# Patient Record
Sex: FEMALE | Race: WHITE | Employment: FULL TIME | URBAN - METROPOLITAN AREA
[De-identification: names, ages, dates, MRNs, and addresses within clinical notes are randomized per-mention and may not be internally consistent; named-entity substitution may affect disease eponyms.]

---

## 2017-06-08 ENCOUNTER — PATIENT OUTREACH (OUTPATIENT)
Dept: OTHER | Age: 62
End: 2017-06-08

## 2017-06-08 NOTE — PROGRESS NOTES
Patient on report as eligible for Case Management. Left discreet message on voicemail with this CM contact information. Will attempt to contact again to offer [address] Management services.

## 2017-06-09 ENCOUNTER — PATIENT OUTREACH (OUTPATIENT)
Dept: OTHER | Age: 62
End: 2017-06-09

## 2017-06-09 NOTE — LETTER
6/9/2017 9:00 AM 
 
Ms. Nisha Phipps [address] [place] [address] Dear Ms. Carolina Adams, My name is Odette Joiner, Employee Care Manager for [employer] and I have been trying to reach you. The Employee Care  is a free-of-charge confidential service provided to our employees and their family members covered by the [employer]. The program will provide an employee and his/her family with the [employer] [facility] expertise to assist in navigating the health care delivery system, provider services, and their overall care needsso as to assure and improve health care interactions and enhance the quality of life. This program is designed to provide you with the opportunity to have a [facility] care manager partner with you for the following services: 
 
1.)  Care transitions-such as when you come home from the hospital 
2.) When help is needed to manage your disease process 3.) When you are faced with managing a chronic or complex medical condition [employer] is dedicated to empowering the good health of its community and improving the quality of care and care experiences for employees and their families. We are commited to safeguarding patient confidentiality and privacy,  assuring that every employee has the respect he or she deserves in managing their health. The information shared with your care manager will not be shared with anyone else aside from those you identify as part of your care team, and will only be used to assist you with any identified care needs. Please contact me if you would like this service provided to you. Sincerely, Odette Joiner, RN 
[phone]

## 2017-06-09 NOTE — PROGRESS NOTES
Patient identified as eligible for [address] [place] [address] services. Second telephone outreach attempted. Left discreet voicemail with this CM confidential contact information. Will send UTR letter.

## 2017-06-23 ENCOUNTER — PATIENT OUTREACH (OUTPATIENT)
Dept: OTHER | Age: 62
End: 2017-06-23

## 2017-07-27 ENCOUNTER — PATIENT OUTREACH (OUTPATIENT)
Dept: OTHER | Age: 62
End: 2017-07-27

## 2017-07-27 NOTE — PROGRESS NOTES
Resolving current episode for case management due to patient unable to reach. Patient has not been reached after repeated calls and letters. Letter sent to patient notifying completion of services due to unable to reach. This writer's contact information and information regarding program services included in materials sent.

## 2017-07-27 NOTE — LETTER
7/27/2017 10:45 AM 
 
Ms. Anupam Salcido [address] [place] [address] Dear Ms. Claudia Sewell, My name is Zuleyma Mehta , Employee Care Manager for [employer], and I have been trying to reach you. The Employee Care  is a free-of-charge, confidential service provided to our employees and their family members covered by the [employer]. Part of my job is to follow up with members who have recently been in the hospital or emergency room, to help them coordinate their care and answer questions they may have about their visit. Due to not being able to reach you within 30 days, I am closing out the current program, but will remain available to you should you have any questions. As healthcare providers, we know that patients do better when they have close follow up with a primary care provider (PCP), especially after a hospital or emergency department visit. If you do not have a PCP, I can help you find one that is convenient to you and covered by your insurance. I can also help you understand any after visit instructions, such as what symptoms to watch out for, or any new or changed medications. Remember that you can access your After Visit Summary by logging into your [employer] account. If you do not have a [employer] account, I can help you request access. Our program is designed to provide you with the opportunity to have a [facility] care manager partner with you for your healthcare needs. Please contact me at the below number if I can provide you with assistance for any of the above services. Sincerely, Zuleyma Mehta, RN 
[phone]

## 2018-06-27 PROBLEM — I48.91 ATRIAL FIBRILLATION WITH RVR (HCC): Status: ACTIVE | Noted: 2018-06-27

## 2018-06-29 ENCOUNTER — PATIENT OUTREACH (OUTPATIENT)
Dept: OTHER | Age: 63
End: 2018-06-29

## 2018-06-29 NOTE — PROGRESS NOTES
Patient on [facility] discharge report dated 6/29. Left message on voicemail. Will attempt to contact again. Need to complete post-discharge assessment.

## 2018-07-02 ENCOUNTER — PATIENT OUTREACH (OUTPATIENT)
Dept: OTHER | Age: 63
End: 2018-07-02

## 2018-07-02 NOTE — PROGRESS NOTES
Second attempt to reach patient for [facility], and discharge assessment. Discreet VM left. Will send UTR letter.

## 2018-07-02 NOTE — LETTER
7/2/2018 10:43 AM 
 
Ms. Thelma Ennis [address] [address] Dear Ms. Gloria Webb, My name is Mayank Valero, Employee Care Manager for [facility], and I have been trying to reach you. The Employee Care  is a free-of-charge, confidential service provided to our employees and their family members covered by the [employer]. Part of my job is to follow up with members who have recently been in the hospital or emergency room, to help them coordinate their care and answer questions they may have about their visit. I am able to provide assistance with medication questions, scheduling needed follow-up appointments, and arranging services like home health or home medical equipment. I can also provide education regarding your hospital or ER visit as well as your medical conditions. As healthcare providers, we know that patients do better when they have close follow up with a primary care provider (PCP), especially after a hospital or emergency department visit. If you do not have a PCP, I can help you find one that is convenient to you and covered by your insurance. I can also help you understand any after visit instructions, such as what symptoms to watch out for, or any new or changed medications. Remember that you can access your After Visit Summary by logging into your [employer] account. If you do not have a [employer] account, I can help you request access. Our program is designed to provide you with the opportunity to have a [facility] care manager partner with you for your healthcare needs. Please contact me at the below number if I can provide you with assistance for any of the above services. Sincerely, Mayank Valero RN, BSN  Employee Care Manager [address] [place] [address], [address] [place], [place] [address] [facility] Cell [phone] Fax [email or web address] 
 [place] [email or web address]. aspx

## 2018-07-26 ENCOUNTER — PATIENT OUTREACH (OUTPATIENT)
Dept: OTHER | Age: 63
End: 2018-07-26

## 2018-07-26 NOTE — PROGRESS NOTES
ZA follow up. * Covering for CM Ivin Leyden, RN    Telephone attempt to contact patient for transitions of care. Left discreet message on voicemail with this CC contact information. Will inform CM was unable to reach.

## 2018-08-23 ENCOUNTER — PATIENT OUTREACH (OUTPATIENT)
Dept: OTHER | Age: 63
End: 2018-08-23

## 2018-08-23 NOTE — LETTER
8/23/2018 12:53 PM 
 
Ms. Candi Butcher [address] [address] Dear Ms. Isidoro Newell, My name is Luba Morillo , Employee Care Manager for [employer], and I have been trying to reach you. The Employee Care Management program is a free-of-charge, confidential service provided to our employees and their family members covered by the [employer]. Part of my job is to follow up with members who have recently been in the hospital or emergency room, to help them coordinate their care and answer questions they may have about their visit. As healthcare providers, we know that patients do better when they have close follow up with a primary care provider (PCP), especially after a hospital or emergency department visit. If you do not have a PCP, I can help you find one that is convenient to you and covered by your insurance. I can also help you understand any after visit instructions, such as what symptoms to watch out for, or any new or changed medications. Remember that you can access your After Visit Summary by logging into your LEHR account. If you do not have a [employer] account, I can help you request access. Our program is designed to provide you with the opportunity to have a [facility] care manager partner with you for your healthcare needs. Please contact me at the below number if I can provide you with assistance for any of the above services. Sincerely, Luba Morillo RN, BSN  Employee Care Manager [address] [place] [address], [address] [place] [address] [address] [place] [address] Cell [phone] Fax [email or web address] ECM [email or web address]. aspx

## 2019-01-09 PROBLEM — F17.200 NICOTINE DEPENDENCE: Status: ACTIVE | Noted: 2019-01-09

## 2019-01-09 PROBLEM — E78.5 HYPERLIPIDEMIA: Chronic | Status: ACTIVE | Noted: 2019-01-09

## 2019-01-14 ENCOUNTER — PATIENT OUTREACH (OUTPATIENT)
Dept: OTHER | Age: 64
End: 2019-01-14

## 2019-01-14 RX ORDER — IBUPROFEN 200 MG
1 TABLET ORAL EVERY 24 HOURS
COMMUNITY
End: 2019-03-12

## 2019-01-14 NOTE — PROGRESS NOTES
Transition Of Care Note Patient discharged from [facility] admitted on  and discharged on  for COPD exacerbation. Medical History:    
Past Medical History:  
Diagnosis Date  Acquired hypothyroidism 2016  Acute respiratory failure ([address] 2016  Arrhythmia   
 afib  Autoimmune disease ([facility] [address] [facility] 2009  
 lupus  Cataract, left  COPD  Delivery normal   
 Diverticulosis  Facial contusion 2011  Gastrointestinal disorder  GERD (gastroesophageal reflux disease)   
 hx of  
 Hypertension  Other ill-defined conditions(799.89)  Seizures ([facility] [address] Hx of, none since childhood  Thyroid disease   
 hypothyroid Care Manager contacted the patient by telephone to perform post hospital discharge assessment. Verified  and zip code with patient as identifiers. Provided introduction to self, and explanation of the Nurse Care Manager role. Red Flags:  You have new or worse trouble breathing. You cough up blood. You have a fever. You cough more deeply or more often, especially if you notice more mucus or a change in the color of your mucus.  You have new or worse swelling in your legs or belly.  You are not getting better as expected. Condition Focused Assessment:  
Patient reports the following:  
Respiratory Condition Focused Assessment Supplemental oxygen use? no  
Clean and working equipment? {n/a Oxygen settings- n/a Cough yes Patient reported important numbers to know: 
Oxygen level 92% at hospital- awaiting pulse ox purchase Temperature afebrile Description of any sputum Opaque-minimal in amount Pain 0/10 Weight Did not weigh today- last weight of 170 Any change in activity level? Yes, but patient reports rapid improvement since hospitalization Any of the following? Shortness of breath or difficulty breathing yes Dizziness/lightheadedness no Fever no Low body temperature no Chills or shaking no Sweating no Dyspnea on exertion yes Fatigue yes Anxiety no Confusionno Unexplained change in weight loss no Sleep disturbance no Incentive Spirometer? yes Does patient have action plan? yes Medication:  
New Medications at Discharge:  
tiotropium (SPIRIVA) 18 mcg inhalation capsule Take 1 Cap by inhalation daily. Qty: 30 Cap, Refills: 0  
   
budesonide-formoterol (SYMBICORT) 160-4.5 mcg/actuation HFAA Take 2 Puffs by inhalation two (2) times a day. Qty: 1 Inhaler, Refills: 0  
   
doxycycline (VIBRAMYCIN) 100 mg capsule Take 1 Cap by mouth every twelve (12) hours for 2 days. Qty: 4 Cap, Refills: 0  
   
predniSONE (DELTASONE) 10 mg tablet 5 tablets daily x 3 days, 4 tablets daily x 3 days, 3 tablets daily x 3 days, 2 tablets daily x 3 days, 1 tablet daily x 3 days Qty: 45 Tab, Refills: 0 Changed Medications at Discharge: none noted Discontinued Medications at Discharge: patient completed vibramycin tabs Current Outpatient Medications Medication Sig  
 tiotropium (SPIRIVA) 18 mcg inhalation capsule Take 1 Cap by inhalation daily.  budesonide-formoterol (SYMBICORT) 160-4.5 mcg/actuation HFAA Take 2 Puffs by inhalation two (2) times a day.  predniSONE (DELTASONE) 10 mg tablet 5 tablets daily x 3 days, 4 tablets daily x 3 days, 3 tablets daily x 3 days, 2 tablets daily x 3 days, 1 tablet daily x 3 days  dilTIAZem CD (CARDIZEM CD) 120 mg ER capsule Take 120 mg by mouth daily.  rivaroxaban (XARELTO) 20 mg tab tablet Take 1 Tab by mouth daily (with dinner).  nebivolol (BYSTOLIC) 10 mg tablet Take 10 mg by mouth nightly.  levothyroxine (SYNTHROID) 75 mcg tablet Take 75 mcg by mouth Daily (before breakfast).  rosuvastatin (CRESTOR) 10 mg tablet Take 10 mg by mouth nightly.  albuterol-ipratropium (DUONEB) 2.5 mg-0.5 mg/3 ml nebu 3 mL by Nebulization route every six (6) hours as needed. Indications: CHRONIC OBSTRUCTIVE PULMONARY DISEASE WITH BRONCHOSPASMS  cholecalciferol (VITAMIN D3) 1,000 unit cap Take 5,000 Units by mouth daily.  albuterol (PROVENTIL HFA, VENTOLIN HFA, PROAIR HFA) 90 mcg/actuation inhaler Take 2 puffs by inhalation every four (4) hours as needed for Wheezing or Shortness of Breath.  KRILL OIL PO Take  by mouth daily. No current facility-administered medications for this visit. There are no discontinued medications. Performed medication reconciliation with patient, and patient verbalizes understanding of administration of home medications. There were no barriers to obtaining medications identified at this time. Inpatient RRAT score: 12 Was this a readmission? no Patient stated reason for the readmission: n/a Barriers/Support system: 
patient and spouse Barriers/Challenges to Care: []  Decline in memory    []  Language barrier    
[]  Emotional                  [x]  Limited mobility 
[]  Lack of motivation     [] Vision, hearing or cognitive impairment []  Knowledge [] Financial Barriers []  Lack of support  []  Pain []  Other []  None CM Identified  Problems 
 (contributing problems for risk for readmission) 1. Impaired gas exchange due to COPD exacerbation- educated on red flag symptoms, and patient has action plan 2. Self-care deficit related to fatigue-pt has supportive family including spouse, sister and child within local area to assist 
3. Tobacco dependence increasing risk for COPD exacerbation- patient states she has had 3 cigarettes in one week, using nicotine patch. Highly motivated to quit Discharge Instructions : 
Reviewed discharge instructions with patient. Patient verbalizes understanding of discharge instructions and follow-up care. Advance Care Planning:  
Patient was offered the opportunity to discuss advance care planning:  no    
Does patient have an Advance Directive:  no If no, did you provide information on Caring Connections?   no  
 
 PCP/Specialist follow up: Patient scheduled to follow up with Nava Mcelroy MD on 1/16. Future Appointments Date Time Provider Joaquina Pemberton 1/21/2019  3:15 PM Neill Osgood,  [facility] Reviewed red flags with patient, and patient verbalizes understanding. Patient given an opportunity to ask questions. No other clinical/social/functional needs noted. The patient agrees to contact the PCP office for questions related to their healthcare. The patient expressed thanks, offered no additional questions and ended the call.

## 2019-01-14 NOTE — PROGRESS NOTES
Patient on [facility] discharge report dated 1/14 for COPD exacerbation at [facility] discharged on 1/12. Patient has previously been unable to reach by this CM. Left message on voicemail. Will attempt to contact again. Need to complete post-discharge assessment.

## 2019-01-22 ENCOUNTER — PATIENT OUTREACH (OUTPATIENT)
Dept: OTHER | Age: 64
End: 2019-01-22

## 2019-01-28 ENCOUNTER — PATIENT OUTREACH (OUTPATIENT)
Dept: OTHER | Age: 64
End: 2019-01-28

## 2019-01-28 NOTE — PROGRESS NOTES
Attempt to reach patient for follow up. Discreet VM left with contact information. To send lost to follow up letter.

## 2019-02-11 ENCOUNTER — PATIENT OUTREACH (OUTPATIENT)
Dept: OTHER | Age: 64
End: 2019-02-11

## 2019-02-11 NOTE — LETTER
2/11/2019 3:45 PM 
 
Ms. Haley Butcher [address] [address] Dear Ms. Giovanni Goltz, My name is Alondra Moreira , Employee Care Manager for [facility], and I have been trying to reach you. The Employee Care Management program is a free-of-charge, confidential service provided to our employees and their family members covered by the [employer]. Part of my job is to follow up with members who have recently been in the hospital or emergency room, to help them coordinate their care and answer questions they may have about their visit. As healthcare providers, we know that patients do better when they have close follow up with a primary care provider (PCP), especially after a hospital or emergency department visit. If you do not have a PCP, I can help you find one that is convenient to you and covered by your insurance. I can also help you understand any after visit instructions, such as what symptoms to watch out for, or any new or changed medications. Remember that you can access your After Visit Summary by logging into your PLTech account. If you do not have a [employer] account, I can help you request access. Our program is designed to provide you with the opportunity to have a [facility] care manager partner with you for your healthcare needs. Please contact me at the below number if I can provide you with assistance for any of the above services. Sincerely, Alondra Moreira RN, BSN  Employee Care Manager [address] [place] [address], [address] [place] [address] Cell [phone] Fax [email or web address] [email or web address]. aspx

## 2019-03-13 ENCOUNTER — PATIENT OUTREACH (OUTPATIENT)
Dept: OTHER | Age: 64
End: 2019-03-13

## 2019-03-13 NOTE — PROGRESS NOTES
Patient on report as eligible for Case Management. Left discreet message on voicemail with this CM contact information. Patient is formerly lost to follow-up, noted CM Mauricio Dowling assigned to patient, sent message regarding unable to reach status. Will also send UTR letter.

## 2019-03-13 NOTE — LETTER
3/13/2019 4:09 PM 
 
Ms. Dwayne Butcher [address] [place] [phone] Dear Ms. Patel Goes, My name is Jovanni Gifford, Employee Care Manager for [employer], and I have been trying to reach you. The Employee Care  is a free-of-charge, confidential service provided to our employees and their family members covered by the [employer]. Part of my job is to follow up with members who have recently been in the hospital or emergency room, to help them coordinate their care and answer questions they may have about their visit. I am able to provide assistance with medication questions, scheduling needed follow-up appointments, and arranging services like home health or home medical equipment. I can also provide education regarding your hospital or ER visit as well as your medical conditions. As healthcare providers, we know that patients do better when they have close follow up with a primary care provider (PCP), especially after a hospital or emergency department visit. If you do not have a PCP, I can help you find one that is convenient to you and covered by your insurance. I can also help you understand any after visit instructions, such as what symptoms to watch out for, or any new or changed medications. Remember that you can access your After Visit Summary by logging into your [employer] account. If you do not have a [employer] account, I can help you request access. Our program is designed to provide you with the opportunity to have a [employer] care manager partner with you for your healthcare needs. Please contact me at the below number if I can provide you with assistance for any of the above services. Sincerely, Jovanni Gifford RN, BSN  Employee Care Manager [address] [place] [address], [address] [place] [address] [facility] Cell [phone] Fax [email or web address] 
 [place] [email or web address]. [email or web address]

## 2019-03-28 ENCOUNTER — PATIENT OUTREACH (OUTPATIENT)
Dept: OTHER | Age: 64
End: 2019-03-28

## 2019-03-28 NOTE — LETTER
3/28/2019 3:48 PM 
 
Ms. Valentina Butcher [address] [address] Dear Ms. Julius Yun, My name is Jose Guadalupe Parisi, Employee Care Manager for [employer], and I have been trying to reach you. The Employee Care Management [facility]) program is a free-of-charge, confidential service provided to our employees and their family members covered by the [facility]. I can help you with care transitions such as when you come home from the hospital, when help is needed to manage your disease, or when you need assistance coordinating services or appointments. [facility] now partners with [facility]. If you are a qualifying employee, you may receive an additional 10 wellness incentive points for every month of active participation with an Employee Care Manager. As healthcare providers, we know that patients do better when they have close follow up with a primary care provider (PCP). I can help you find one that is convenient to you and covered by your insurance. I can also help you understand any after visit instructions, such as what symptoms to watch out for, or any new or changed medications. We can work together using your preferred communication -- telephone, email, [email or web address]. If you do not have a [employer] account, I can help you request access. Our program is designed to provide you with the opportunity to have a [employer] care manager partner with you for your healthcare needs. Due to not being able to reach you, I am closing out the current program, but will remain available to you should you have any questions. Please contact me at the below number if I can provide you with assistance for any of the above services. Jose Guadalupe Parisi RN, BSN  Employee Care Manager [address] [place], [place] [address] [facility] Cell [phone] Fax [email or web address]. aspx

## 2019-05-30 ENCOUNTER — PATIENT OUTREACH (OUTPATIENT)
Dept: OTHER | Age: 64
End: 2019-05-30

## 2019-05-30 NOTE — PROGRESS NOTES
Patient on report as eligible for Case Management. Left discreet message on voicemail with this CM contact information. Will attempt to contact again to offer [phone] Management services.

## 2019-05-31 ENCOUNTER — PATIENT OUTREACH (OUTPATIENT)
Dept: OTHER | Age: 64
End: 2019-05-31

## 2019-05-31 NOTE — PROGRESS NOTES
Patient identified as eligible for [address] [place] [address] services. Second telephone outreach attempted. Left discreet voicemail with this CM confidential contact information. Will send UTR letter.

## 2019-05-31 NOTE — LETTER
5/31/2019 10:29 AM 
 
Ms. Robin Butcher [address] [address] Dear Ms. Clemensa Omega, My name is Andi Puentes, Employee Care Manager for [employer] and I have been trying to reach you. The Employee Care Management [facility]) program is a free-of-charge confidential service provided to our employees and their family members covered by the [facility]. The program will provide an employee and his/her family with the [employer] expertise to assist in navigating the health care delivery system, provider services, and their overall care needsso as to assure and improve health care interactions and enhance the quality of life. This program is designed to provide you with the opportunity to have a [employer] care manager partner with you for the following services: 
 
 1) when you come home from the hospital or emergency room 2) when help is needed to manage your disease 3) when you need assistance coordinating services or appointments [facility] now partners with [facility]. If you are a qualifying employee, you may receive an additional 10 wellness incentive points for every month of active participation with an Employee Care Manager. [employer] is dedicated to empowering the good health of its community and improving the quality of care and care experiences for employees and their families. We are committed to safeguarding patient confidentiality and privacy, assuring that every employee has the respect he or she deserves in managing their health. The information shared with your care manager will not be shared with anyone else aside from those you identify as part of your care team, and will only be used to assist you with any identified care needs. Please contact me if you would like this service provided to you. Sincerely, Snow Abbasi, RN, BSN  Employee Care Manager [address] [place] [address], [address] [place] [address] [address] [facility] [place] [phone]  F [phone]  [email or web address] [email or web address]

## 2019-06-07 ENCOUNTER — PATIENT OUTREACH (OUTPATIENT)
Dept: OTHER | Age: 64
End: 2019-06-07

## 2019-06-07 NOTE — PROGRESS NOTES
Patient identified as eligible for [address] [place] [address] services. Second telephone outreach attempted. Left discreet voicemail with this CM confidential contact information. UTR letter sent on 5/31.

## 2019-06-28 ENCOUNTER — DOCUMENTATION ONLY (OUTPATIENT)
Dept: OTHER | Age: 64
End: 2019-06-28

## 2020-01-14 ENCOUNTER — NURSE TRIAGE (OUTPATIENT)
Dept: OTHER | Facility: CLINIC | Age: 65
End: 2020-01-14

## 2020-01-14 NOTE — TELEPHONE ENCOUNTER
Reason for Disposition   Passing pure blood or large blood clots (i.e., size > a dime) (Exception: dima or small strands)    Protocols used: URINE - BLOOD IN-ADULT-    Patient is calling because she is passing bright red blood in her urine. Patient stated it is a large amount. Recently had a nodule found on kidney. Patient is on blood thinner. Current pain level is 6/10. Please do not reply to this triage encounter. Any further communication will need to be made with the patient.

## 2020-05-14 ENCOUNTER — NURSE TRIAGE (OUTPATIENT)
Dept: OTHER | Facility: CLINIC | Age: 65
End: 2020-05-14

## 2020-05-14 NOTE — TELEPHONE ENCOUNTER
Reason for Disposition   [1] MODERATE difficulty breathing (e.g., speaks in phrases, SOB even at rest, pulse 100-120) AND [2] NEW-onset or WORSE than normal    Protocols used: BREATHING DIFFICULTY-ADULT-    Caller reports she is experiencing difficulty breathing. Caller is having a difficult time catching her breath while speaking to me. Caller does have COPD and has tried two breathing treatments with little relief. Directed caller to the ER for immediate treatment.

## 2022-03-19 PROBLEM — E78.5 HYPERLIPIDEMIA: Status: ACTIVE | Noted: 2019-01-09

## 2022-03-20 PROBLEM — F17.200 NICOTINE DEPENDENCE: Status: ACTIVE | Noted: 2019-01-09

## 2022-03-20 PROBLEM — I48.91 ATRIAL FIBRILLATION WITH RVR (HCC): Status: ACTIVE | Noted: 2018-06-27

## 2023-05-12 RX ORDER — ROSUVASTATIN CALCIUM 10 MG/1
10 TABLET, COATED ORAL NIGHTLY
COMMUNITY

## 2023-05-12 RX ORDER — NEBIVOLOL 10 MG/1
10 TABLET ORAL NIGHTLY
COMMUNITY

## 2023-05-12 RX ORDER — DILTIAZEM HYDROCHLORIDE 120 MG/1
120 CAPSULE, EXTENDED RELEASE ORAL DAILY
COMMUNITY

## 2023-05-12 RX ORDER — ALBUTEROL SULFATE 90 UG/1
2 AEROSOL, METERED RESPIRATORY (INHALATION) EVERY 4 HOURS PRN
COMMUNITY
Start: 2014-11-28

## 2023-05-12 RX ORDER — LEVOTHYROXINE SODIUM 0.07 MG/1
75 TABLET ORAL
COMMUNITY

## 2023-05-12 RX ORDER — IPRATROPIUM BROMIDE AND ALBUTEROL SULFATE 2.5; .5 MG/3ML; MG/3ML
3 SOLUTION RESPIRATORY (INHALATION) EVERY 6 HOURS PRN
COMMUNITY
Start: 2016-05-05